# Patient Record
Sex: MALE | Race: WHITE
[De-identification: names, ages, dates, MRNs, and addresses within clinical notes are randomized per-mention and may not be internally consistent; named-entity substitution may affect disease eponyms.]

---

## 2018-02-19 ENCOUNTER — HOSPITAL ENCOUNTER (OUTPATIENT)
Dept: HOSPITAL 45 - C.ULTR | Age: 23
End: 2018-02-19
Attending: PEDIATRICS
Payer: COMMERCIAL

## 2018-02-19 DIAGNOSIS — R22.42: Primary | ICD-10-CM

## 2018-02-19 NOTE — DIAGNOSTIC IMAGING REPORT
LEFT CALF ULTRASOUND



CLINICAL HISTORY: LOWER LEFT MASS/LUMP CALF   



COMPARISON STUDY:  None.



FINDINGS: Trace edema seen within the left medial calf. No masses identified. No

loculated fluid collections.



IMPRESSION:  Trace edema within the left calf. No masses identified.









Electronically signed by:  Jim Palumbo M.D.

2/19/2018 4:12 PM



Dictated Date/Time:  2/19/2018 4:10 PM